# Patient Record
Sex: MALE | Race: WHITE | URBAN - METROPOLITAN AREA
[De-identification: names, ages, dates, MRNs, and addresses within clinical notes are randomized per-mention and may not be internally consistent; named-entity substitution may affect disease eponyms.]

---

## 2021-10-25 ENCOUNTER — OFFICE VISIT (OUTPATIENT)
Dept: URBAN - METROPOLITAN AREA CLINIC 24 | Facility: CLINIC | Age: 76
End: 2021-10-25
Payer: MEDICARE

## 2021-10-25 DIAGNOSIS — H35.371 PUCKERING OF MACULA, RIGHT EYE: Primary | ICD-10-CM

## 2021-10-25 DIAGNOSIS — H25.13 AGE-RELATED NUCLEAR CATARACT, BILATERAL: ICD-10-CM

## 2021-10-25 PROCEDURE — 92134 CPTRZ OPH DX IMG PST SGM RTA: CPT | Performed by: OPHTHALMOLOGY

## 2021-10-25 PROCEDURE — 99204 OFFICE O/P NEW MOD 45 MIN: CPT | Performed by: OPHTHALMOLOGY

## 2021-10-25 ASSESSMENT — INTRAOCULAR PRESSURE
OS: 11
OD: 11

## 2021-10-25 NOTE — IMPRESSION/PLAN
Impression: Puckering of macula, right Plan: RIGHT eye floaters / ERM -- KNOWN and documented over a decade ago. PERSIST ERM OD > OS w floaters but NOT WORSE vs. '09 notes. Fovea intact. OK proceed Ce/IOL surgery. Risk CME or ERM progressive is not zero. Use Gtts or injx postop. RETINA PRN w colors / OCT if ERM worsening.

## 2021-10-25 NOTE — IMPRESSION/PLAN
Impression: Age-related nuclear cataract Plan: Cataract OU moderate w progressive symptoms. OK plan Ce/IOL surgery now .  - Appreciate referral from Dr. Dejuan Monreal